# Patient Record
(demographics unavailable — no encounter records)

---

## 2019-08-23 NOTE — EDPHYS
Physician Documentation                                                                           

 [facility] – [place], [place]                                                                 

Name: Jessica Tejada                                                                               

Age: 8 yrs                                                                                        

Sex: Female                                                                                       

: 2011                                                                                   

MRN: [id number]                                                                                   

Arrival Date: 2019                                                                          

Time: 20:45                                                                                       

Account#: [id number]                                                                            

Bed 27                                                                                            

Private MD: Buzz Jay                                                                     

ED Physician Pal Haynes                                                                     

HPI:                                                                                              

                                                                                             

21:17 This 8 yrs old  Female presents to ER via Ambulatory with complaints of Finger  cp  

      Injury.                                                                                     

21:17 The patient or guardian reports injury, pain, swelling, tenderness. The complaints      cp  

      affect the right small finger.                                                              

21:17 Context: The problem was sustained at school, resulted from a direct blow, against      cp  

      chair. Onset: The symptoms/episode began/occurred today.                                    

                                                                                                  

Historical:                                                                                       

- Allergies:                                                                                      

21:01 No Known Allergies;                                                                     rv  

- Home Meds:                                                                                      

21:01 None [Active];                                                                          rv  

- PMHx:                                                                                           

21:01 None;                                                                                   rv  

- PSHx:                                                                                           

21:01 None;                                                                                   rv  

                                                                                                  

- Immunization history:: Childhood immunizations are up to date.                                  

- Ebola Screening: : No symptoms or risks identified at this time.                                

                                                                                                  

                                                                                                  

ROS:                                                                                              

21:30 Constitutional: Negative for body aches, chills, fever.                                 cp  

21:30 MS/extremity: Positive for injury or acute deformity, decreased range of motion, pain,  cp  

      swelling, tenderness, of the right small finger.                                            

21:30 All other systems are negative.                                                             

                                                                                                  

Exam:                                                                                             

21:35 Constitutional: The patient appears in no acute distress, alert, awake, well developed, cp  

      well nourished.                                                                             

21:35 Head/Face:  Normocephalic, atraumatic.                                                  cp  

21:35 Musculoskeletal/extremity: Extremities: grossly normal except: noted in the dorsal          

      aspect of proximal phalanx of right little finger and dorsal aspect of middle phalanx       

      of right little finger: pain, swelling, tenderness, ROM: limited passive range of           

      motion due to pain, in the right small finger, Perfusion: the extremity is normally         

      perfused throughout, Sensation intact.                                                      

21:35 Skin: cellulitis, is not appreciated, no rash present.                                      

                                                                                                  

Vital Signs:                                                                                      

21:00  / 62; Pulse 77; Resp 16; Temp 98.4; Pulse Ox 98% ; Weight 40.45 kg (M);          rv  

22:13 BP 90 / 73; Pulse 71; Resp 16; Pulse Ox 98% on R/A;                                     rv  

                                                                                                  

Procedures:                                                                                       

22:15 Splinting: Splint applied to right hand using Orthoglass splint, ulna gutter type.      cp  

      applied by nurse. Examined by me, post splint application: neurovascular intact,            

      Patient tolerated well.                                                                     

                                                                                                  

MDM:                                                                                              

20:55 Patient medically screened.                                                             cp  

22:07 Test interpretation: by ED physician or midlevel provider: xrays of right hand show     cp  

      fracture base of proximal phalanx fifth finger.                                             

22:10 Data reviewed: vital signs, nurses notes, radiologic studies, plain films.              cp  

22:10 Differential diagnosis: dislocation, open fracture, closed fracture, contusion.         cp  

      Response to treatment: the patient's symptoms have markedly improved after treatment.       

22:10 Counseling: I had a detailed discussion with the patient and/or guardian regarding: the cp  

      historical points, exam findings, and any diagnostic results supporting the                 

      discharge/admit diagnosis, radiology results, the need for outpatient follow up, a hand     

      specialist, to return to the emergency department if symptoms worsen or persist or if       

      there are any questions or concerns that arise at home.                                     

                                                                                                  

                                                                                             

21:22 Order name: XRAY Hand RIGHT 3 View                                                      cp  

                                                                                             

22:06 Order name: Splint - Ulnar Gutter; Complete Time: 22:11                                 cp  

                                                                                             

22:07 Order name: Misc. Order: carol tape fourth and fifth fingers; Complete Time: 22:11      cp  

                                                                                                  

Administered Medications:                                                                         

21:15 Drug: Tylenol 15 mg/kg Route: PO;                                                       rv  

22:11 Follow up: Response: No adverse reaction; Pain is decreased                             rv  

21:15 Drug: Motrin Suspension 10 mg/kg Route: PO;                                             rv  

22:12 Follow up: Response: No adverse reaction; Pain is decreased                             rv  

                                                                                                  

                                                                                                  

Disposition:                                                                                      

22:30 Chart complete.                                                                         cp  

                                                                                             

06:03 Co-signature as Attending Physician, Pal Haynes MD I agree with the assessment and tw4 

      plan of care.                                                                               

                                                                                                  

Disposition:                                                                                      

19 22:11 Discharged to Home. Impression: Nondisplaced fracture of proximal phalanx of       

  right little finger.                                                                            

- Condition is Stable.                                                                            

- Discharge Instructions: Ibuprofen Dosage Chart, Pediatric, Finger Fracture.                     

                                                                                                  

- Medication Reconciliation Form, Thank You Letter, Antibiotic Education, Prescription            

  Opioid Use form.                                                                                

- Follow up: Dennis Madera MD; When: 2 - 3 days; Reason: right fifth finger proximal           

  phalanx fracture.                                                                               

- Problem is new.                                                                                 

- Symptoms have improved.                                                                         

                                                                                                  

                                                                                                  

                                                                                                  

Signatures:                                                                                       

Dispatcher [employer]                           EDMS                                                 

Smith Laguna PA PA cp Wadley, Terrence, MD MD   tw4                                                  

Hebert Delgado RN                    RN   rv                                                   

                                                                                                  

Corrections: (The following items were deleted from the chart)                                    

                                                                                             

22:18 22:11 2019 22:11 Discharged to Home. Impression: Nondisplaced fracture of         rv  

      proximal phalanx of right little finger. Condition is Stable. Forms are Medication          

      Reconciliation Form, Thank You Letter, Antibiotic Education, Prescription Opioid Use.       

      Follow up: Dennis Madera; When: 2 - 3 days; Reason: right fifth finger proximal            

      phalanx fracture. Problem is new. Symptoms have improved. cp                                

                                                                                                  

**************************************************************************************************

## 2019-08-23 NOTE — ER
Nurse's Notes                                                                                     

 [facility]                                                                 

Name: Jessica Tejada                                                                               

Age: 8 yrs                                                                                        

Sex: Female                                                                                       

: 2011                                                                                   

MRN: [id number]                                                                                   

Arrival Date: 2019                                                                          

Time: 20:45                                                                                       

Account#: [id number]                                                                            

Bed 27                                                                                            

Private MD: Buzz Jay                                                                     

Diagnosis: Nondisplaced fracture of proximal phalanx of right little finger                       

                                                                                                  

Presentation:                                                                                     

                                                                                             

20:58 Presenting complaint: Mother states: SHE SMASHED HER FINGER IN SCHOOL EARLIER TODAY.    rv  

      THE NURSE PUT ICE PACK ON IT. SHE WAS FINE EARLIER BUT TONIGHT HER FINGER STARTED           

      SWELLING AND SHE IS HURTING A LOT. Transition of care: patient was not received from        

      another setting of care. Onset of symptoms was 2019 at 15:00. Care prior to      

      arrival: None.                                                                              

20:58 Method Of Arrival: Ambulatory                                                           rv  

20:58 Acuity: DENIA 3                                                                           rv  

                                                                                                  

Historical:                                                                                       

- Allergies:                                                                                      

21:01 No Known Allergies;                                                                     rv  

- Home Meds:                                                                                      

21:01 None [Active];                                                                          rv  

- PMHx:                                                                                           

21:01 None;                                                                                   rv  

- PSHx:                                                                                           

21:01 None;                                                                                   rv  

                                                                                                  

- Immunization history:: Childhood immunizations are up to date.                                  

- Ebola Screening: : No symptoms or risks identified at this time.                                

                                                                                                  

                                                                                                  

Screenin:03 Abuse screen: Denies threats or abuse. Denies injuries from another. Nutritional        rv  

      screening: No deficits noted. Tuberculosis screening: No symptoms or risk factors           

      identified.                                                                                 

21:03 Pedi Fall Risk Total Score: 0-1 Points : Low Risk for Falls.                            rv  

                                                                                                  

      Fall Risk Scale Score:                                                                      

21:03 Mobility: Ambulatory with no gait disturbance (0); Mentation: Developmentally           rv  

      appropriate and alert (0); Elimination: Independent (0); Hx of Falls: No (0); Current       

      Meds: No (0); Total Score: 0                                                                

Assessment:                                                                                       

21:01 General: Appears in no apparent distress. comfortable, Behavior is calm, cooperative.   rv  

      Pain: Complains of pain in dorsal aspect of middle phalanx of right little finger and       

      dorsal aspect of proximal phalanx of right little finger Pain does not radiate. Neuro:      

      Level of Consciousness is awake, alert, obeys commands, Oriented to person, place,          

      time, situation. Cardiovascular: Patient's skin is warm and dry. Respiratory: Airway is     

      patent. GI: No signs and/or symptoms were reported involving the gastrointestinal           

      system. : No signs and/or symptoms were reported regarding the genitourinary system.      

      EENT: No signs and/or symptoms were reported regarding the EENT system. Derm: Bruising      

      that is dark purple, on dorsal aspect of middle phalanx of right little finger and          

      dorsal aspect of proximal phalanx of right little finger. Musculoskeletal: Swelling         

      present in dorsal aspect of middle phalanx of right little finger and dorsal aspect of      

      proximal phalanx of right little finger Reports pain in dorsal aspect of middle phalanx     

      of right little finger and dorsal aspect of proximal phalanx of right little finger.        

      Injury Description:.                                                                        

22:12 Reassessment: Patient appears in no apparent distress at this time. Patient and/or      rv  

      family updated on plan of care and expected duration. Pain level reassessed. Patient is     

      alert/active/playful, equal unlabored respirations, skin warm/dry/pink.                     

                                                                                                  

Vital Signs:                                                                                      

21:00  / 62; Pulse 77; Resp 16; Temp 98.4; Pulse Ox 98% ; Weight 40.45 kg (M);          rv  

22:13 BP 90 / 73; Pulse 71; Resp 16; Pulse Ox 98% on R/A;                                     rv  

                                                                                                  

ED Course:                                                                                        

20:45 Patient arrived in ED.                                                                  es  

20:46 Buzz Jay MD is Private Physician.                                             es  

20:54 [facility]                                                                cp  

20:54 Pal Haynes MD is Attending Physician.                                            cp  

20:56 Hebert Delgado, FABIAN is Primary Nurse.                                                  rv  

21:00 Triage completed.                                                                       rv  

21:03 Arm band placed on left wrist.                                                          rv  

21:03 Patient has correct armband on for positive identification. Bed in low position. Call   rv  

      light in reach. Side rails up X 1. Adult w/ patient. Pulse ox on. NIBP on. Door closed.     

      Warm blanket given. Ice pack to injury.                                                     

21:34 XRAY Hand RIGHT 3 View In Process Unspecified.                                          EDMS

22:09 Dennis Madera MD is Referral Physician.                                              cp  

22:12 No provider procedures requiring assistance completed. Patient did not have IV access   rv  

      during this emergency room visit. Hardeep tape 4th and 5th digit of the right hand            

      Orthoglass splint: Ulnar gutter/Boxer splint applied on right forearm.                      

                                                                                                  

Administered Medications:                                                                         

21:15 Drug: Tylenol 15 mg/kg Route: PO;                                                       rv  

22:11 Follow up: Response: No adverse reaction; Pain is decreased                             rv  

21:15 Drug: Motrin Suspension 10 mg/kg Route: PO;                                             rv  

22:12 Follow up: Response: No adverse reaction; Pain is decreased                             rv  

                                                                                                  

                                                                                                  

Outcome:                                                                                          

22:11 Discharge ordered by MD.                                                                cp  

22:14 Discharged to home ambulatory, with family.                                             rv  

22:14 Condition: improved                                                                         

22:14 Discharge instructions given to patient, family, Instructed on discharge instructions,      

      follow up and referral plans. Demonstrated understanding of instructions, follow-up         

      care, splint care.                                                                          

22:18 Patient left the ED.                                                                    rv  

                                                                                                  

Signatures:                                                                                       

Dispatcher [employer]                           Gia Cisneros Corey, PA PA cp Vicente, Ronaldo RN                    RN   rv                                                   

                                                                                                  

Corrections: (The following items were deleted from the chart)                                    

22:15 22:14 Discharge instructions given to patient, family, Instructed on discharge          rv  

      instructions, follow up and referral plans. medication usage, Demonstrated                  

      understanding of instructions, follow-up care, medications, splint care, Prescriptions      

      given X 1, rv                                                                               

                                                                                                  

**************************************************************************************************

## 2019-08-24 NOTE — RAD REPORT
EXAM DESCRIPTION:  RAD - Hand Right 3 View - 8/23/2019 9:45 pm

 

CLINICAL HISTORY:  Blunt force trauma to a right hand finger. Sided injury not specified.

 

COMPARISON:  None.

 

FINDINGS:  Minimal fracture changes present at the base of the right fifth proximal phalanx. Fracture
 is on the ulna side of the metaphysis at the growth plate level. No distraction or angulation deform
ity. No other fracture of the fifth digit identified. Soft tissue swelling is present without foreign
 body.

 

 Remainder the hand shows no fracture or acute finding. No foreign body elsewhere in the hand.

 

IMPRESSION:  Minimal fracture change at the base of the fifth proximal phalanx with no distraction or
 angulation deformity.

## 2021-09-04 NOTE — XMS REPORT
Continuity of Care Document

                          Created on:2021



Patient:PHU STARKEY

Sex:Female

:2011

External Reference #:[id number]





Demographics







                          Address                   [address]



                                                    [place] [address]

 

                          Home Phone                [phone]

 

                          Email Address             NONE

 

                          Preferred Language        Unknown

 

                          Marital Status            Unknown

 

                          Mandaen Affiliation     Unknown

 

                          Race                      Unknown

 

                          Additional Race(s)        Unavailable

 

                          Ethnic Group              Unknown









Author







                          Organization              [facility]

t

 

                          Address                   [address] [place] [id number]

 

                          Phone                     [phone]









Care Team Providers







                    Name                Role                Phone

 

                    Unavailable         Unavailable         Unavailable









Problems

This patient has no known problems.



Allergies, Adverse Reactions, Alerts

This patient has no known allergies or adverse reactions.



Medications

This patient has no known medications.



Procedures

This patient has no known procedures.



Results

This patient has no known results.

## 2021-09-04 NOTE — EDPHYS
Physician Documentation                                                                           

 [facility]                                                                 

Name: Jessica Tejada                                                                               

Age: 10 yrs                                                                                       

Sex: Female                                                                                       

: 2011                                                                                   

MRN: [id number]                                                                                   

Arrival Date: 2021                                                                          

Time: 07:35                                                                                       

Account#: [id number]                                                                            

Bed DIS11                                                                                         

Private MD: Buzz Jay                                                                     

ED Physician Smith Katz                                                                      

HPI:                                                                                              

                                                                                             

09:45 This 10 yrs old  Female presents to ER via Ambulatory with complaints of Fever, pm1 

      Congestion, Cough.                                                                          

09:45 The parent or caregiver reports fever, that was measured at 103 degrees Fahrenheit.     pm1 

      Onset: The symptoms/episode began/occurred 3 day(s) ago. Modifying factors: there are       

      no obvious modifying factors. Associated signs and symptoms: Pertinent positives:           

      cough, body aches, sore throat, Pertinent negatives: chest pain, shortness of breath,       

      patient is able to tolerate oral fluids. Severity of symptoms: in the emergency             

      department the symptoms are unchanged. The patient has not experienced similar symptoms     

      in the past. The patient has not recently seen a physician.                                 

                                                                                                  

OB/GYN:                                                                                           

10:09 LMP N/A -                                                                               iw  

                                                                                                  

Historical:                                                                                       

- Allergies:                                                                                      

07:55 No Known Allergies;                                                                     aa5 

- PMHx:                                                                                           

07:55 None;                                                                                   aa5 

- PSHx:                                                                                           

07:55 None;                                                                                   aa5 

                                                                                                  

- Immunization history:: Childhood immunizations are up to date.                                  

                                                                                                  

                                                                                                  

ROS:                                                                                              

09:45 Eyes: Negative for injury, pain, redness, and discharge.                                pm1 

09:45 Cardiovascular: Negative for chest pain, palpitations, and edema.                           

09:45 Abdomen/GI: Negative for abdominal pain, nausea, vomiting, diarrhea, and constipation,      

      Back: Negative for injury and pain, MS/Extremity: Negative for injury and deformity,        

      Skin: Negative for injury, rash, and discoloration, Neuro: Negative for headache,           

      weakness, numbness, tingling, and seizure.                                                  

09:45 Constitutional: Positive for body aches, fever, Negative for poor PO intake.                

09:45 ENT: Positive for sore throat.                                                              

09:45 Respiratory: Positive for cough, Negative for shortness of breath, sputum production.       

09:45 All other systems are negative.                                                             

                                                                                                  

Exam:                                                                                             

09:45 Constitutional:  Well developed, well nourished child who is awake, alert and           pm1 

      cooperative with no acute distress. Head/Face:  Normocephalic, atraumatic.                  

09:45 Back:  No spinal tenderness.  No costovertebral tenderness.  Full range of motion.          

      Skin:  Warm and dry with excellent turgor.  capillary refill <2 seconds.  No cyanosis,      

      pallor, rash or edema. MS/ Extremity:  Pulses equal, no cyanosis.  Neurovascular            

      intact.  Full, normal range of motion.                                                      

09:45 Eyes: Exam is negative for acute changes, Extraocular movements: no acute changes,          

      Conjunctiva: no acute changes, no injection, Sclera: no acute changes, icterus, is not      

      appreciated.                                                                                

09:45 ENT: Exam is negative for acute changes, TM's: no acute changes, Posterior pharynx: no      

      acute changes.                                                                              

09:45 Cardiovascular: Exam negative for  acute changes, Rate: normal, Rhythm: regular,            

      Pulses: no pulse deficits are appreciated, Heart sounds: normal, normal S1and S2.           

09:45 Respiratory: Exam negative for  acute changes, respiratory distress, shortness of           

      breath, Breath sounds: are clear throughout.                                                

09:45 Neuro: Exam negative for acute changes, Orientation: is normal, Motor: is normal, moves     

      all fours.                                                                                  

                                                                                                  

Vital Signs:                                                                                      

07:56  / 66; Pulse 126; Resp 22 S; Temp 103.2(O); Pulse Ox 100% on R/A;                 aa5 

08:03 Weight 53.52 kg (M);                                                                    aa5 

09:34  / 64; Pulse 92; Resp 16 S; Temp 100.2(O); Pulse Ox 99% on R/A;                   aa5 

                                                                                                  

MDM:                                                                                              

09:39 Patient medically screened.                                                             pm1 

09:46 Data reviewed: vital signs. Data interpreted: Pulse oximetry: on room air is 99 %.      pm1 

      Interpretation: normal. Counseling: I had a detailed discussion with the patient and/or     

      guardian regarding: the historical points, exam findings, and any diagnostic results        

      supporting the discharge/admit diagnosis, lab results, the need for outpatient follow       

      up, to return to the emergency department if symptoms worsen or persist or if there are     

      any questions or concerns that arise at home.                                               

                                                                                                  

                                                                                             

07:58 Order name: COVID-19 : Document "Date of Symptom Onset" if Symptomatic.                 aa5 

                                                                                             

07:58 Order name: Strep                                                                       5 

                                                                                             

07:58 Order name: Flu                                                                         5 

                                                                                             

07:58 Order name: Group A Streptococcus Rapid Sc; Complete Time: 09:39                        EDMS

09/04                                                                                             

08:36 Order name: Throat Culture                                                              EDMS

                                                                                             

09:31 Order name: COVID-19/FLU A+B; Complete Time: 09:39                                      EDMS

                                                                                                  

Administered Medications:                                                                         

08:04 CANCELLED (Physician Discretion): Tylenol (acetaminophen) 15 mg/kg PO once; not to      aa5 

      exceed 1,000 milligrams                                                                     

08:07 Drug: Tylenol 650 mg Route: PO;                                                         aa5 

                                                                                                  

                                                                                                  

Disposition Summary:                                                                              

21 09:47                                                                                    

Discharge Ordered                                                                                 

      Location: Home                                                                          pm1 

      Problem: new                                                                            pm1 

      Symptoms: have improved                                                                 pm1 

      Condition: Stable                                                                       pm1 

      Diagnosis                                                                                   

        - Coronavirus infection, unspecified                                                  pm1 

      Followup:                                                                               pm1 

        - With: Emergency Department                                                               

        - When: As needed                                                                          

        - Reason: Worsening of condition                                                           

      Followup:                                                                               pm1 

        - With: Private Physician                                                                  

        - When: 2 - 3 days                                                                         

        - Reason: Recheck today's complaints, Continuance of care, Re-evaluation by your           

      physician                                                                                   

      Discharge Instructions:                                                                     

        - Discharge Summary Sheet                                                             pm1 

        - COVID-19                                                                            pm1 

        - COVID-19 Frequently Asked Questions                                                 pm1 

        - 10 Things You Can Do to Manage Your COVID-19 Symptoms at Home - [facility]                 pm1 

      Forms:                                                                                      

        - Medication Reconciliation Form                                                      pm1 

        - School release form                                                                 pm1 

        - Thank You Letter                                                                    pm1 

        - Antibiotic Education                                                                pm1 

        - Prescription Opioid Use                                                             pm1 

Addendum:                                                                                         

2021                                                                                        

     15:09 Co-signature as Attending Physician, Smith Katz MD I agree with the assessment and  c
ha

           plan of care.                                                                          

                                                                                                  

Signatures:                                                                                       

Dispatcher [employer]                           Smith England MD MD cha Calderon, Audri, RN                     RN   aa5                                                  

Antonio Kyle, DILLAN                    NP   pm1                                                  

                                                                                                  

Corrections: (The following items were deleted from the chart)                                    

                                                                                             

08:04 08:03 Tylenol (acetaminophen) 15 mg/kg PO once; not to exceed 1,000 milligrams ordered. aa5 

      aa5                                                                                         

08:14 07:58 CORONAVIRUS ordered. EDMS                                                         EDMS

08:16 07:58 Influenza Screen (A ordered. EDMS                                                 EDMS

                                                                                                  

**************************************************************************************************

## 2021-09-04 NOTE — ER
Nurse's Notes                                                                                     

 [facility]                                                                 

Name: Jessica Tejada                                                                               

Age: 10 yrs                                                                                       

Sex: Female                                                                                       

: 2011                                                                                   

MRN: [id number]                                                                                   

Arrival Date: 2021                                                                          

Time: 07:35                                                                                       

Account#: [id number]                                                                            

Bed DIS11                                                                                         

Private MD: Buzz Jay                                                                     

Diagnosis: Coronavirus infection, unspecified                                                     

                                                                                                  

Presentation:                                                                                     

                                                                                             

07:56 Chief complaint: Patient fever x 3 days ago. Pt also reports cough and sore throat.     aa5 

      Coronavirus screen: cough unrelated to allergies, fever. Ebola Screen: Patient denies       

      travel to an Ebola-affected area in the 21 days before illness onset. Onset of symptoms     

      was 2021.                                                                         

07:56 Method Of Arrival: Ambulatory                                                           aa5 

07:56 Acuity: DENIA 4                                                                           aa5 

                                                                                                  

OB/GYN:                                                                                           

10:09 LMP N/A -                                                                               iw  

                                                                                                  

Historical:                                                                                       

- Allergies:                                                                                      

07:55 No Known Allergies;                                                                     aa5 

- PMHx:                                                                                           

07:55 None;                                                                                   aa5 

- PSHx:                                                                                           

07:55 None;                                                                                   aa5 

                                                                                                  

- Immunization history:: Childhood immunizations are up to date.                                  

                                                                                                  

                                                                                                  

Screening:                                                                                        

10:09 Abuse screen: Denies threats or abuse. Denies injuries from another. Nutritional        iw  

      screening: No deficits noted. Tuberculosis screening: No symptoms or risk factors           

      identified.                                                                                 

10:09 Pedi Fall Risk Total Score: 0-1 Points : Low Risk for Falls.                            iw  

                                                                                                  

      Fall Risk Scale Score:                                                                      

10:09 Mobility: Ambulatory with no gait disturbance (0); Mentation: Developmentally           iw  

      appropriate and alert (0); Elimination: Independent (0); Hx of Falls: No (0); Current       

      Meds: No (0); Total Score: 0                                                                

Assessment:                                                                                       

10:08 General: Appears in no apparent distress. Behavior is calm, cooperative. Pain: Denies   iw  

      pain. Neuro: Level of Consciousness is awake, alert, obeys commands, Oriented to            

      person, place, time, situation. Cardiovascular: Patient's skin is warm and dry.             

      Respiratory: Airway is patent Breath sounds are clear bilaterally. GI: No signs and/or      

      symptoms were reported involving the gastrointestinal system. Derm: Skin is intact, is      

      healthy with good turgor. Musculoskeletal: Range of motion: intact in all extremities.      

      Age appropriate behavior- School age (6 to 12 yrs): understands body, Tries to problem      

      solve.                                                                                      

                                                                                                  

Vital Signs:                                                                                      

07:56  / 66; Pulse 126; Resp 22 S; Temp 103.2(O); Pulse Ox 100% on R/A;                 aa5 

08:03 Weight 53.52 kg (M);                                                                    aa5 

09:34  / 64; Pulse 92; Resp 16 S; Temp 100.2(O); Pulse Ox 99% on R/A;                   aa5 

                                                                                                  

ED Course:                                                                                        

07:35 Patient arrived in ED.                                                                  as  

07:36 Buzz Jay MD is Private Physician.                                             as  

07:56 Arm band placed on.                                                                     aa5 

07:57 Triage completed.                                                                       aa5 

08:07 COVID swab sent to lab. Flu and/or RSV swab sent to lab. Strep swab sent to lab.        aa5 

09:39 [facility]                                                           pm1 

09:39 Smith Katz MD is Attending Physician.                                             pm1 

10:00 Patient has correct armband on for positive identification.                             iw  

10:09 No provider procedures requiring assistance completed. Patient did not have IV access   iw  

      during this emergency room visit.                                                           

                                                                                                  

Administered Medications:                                                                         

08:04 CANCELLED (Physician Discretion): Tylenol (acetaminophen) 15 mg/kg PO once; not to      aa5 

      exceed 1,000 milligrams                                                                     

08:07 Drug: Tylenol 650 mg Route: PO;                                                         aa5 

                                                                                                  

                                                                                                  

Outcome:                                                                                          

09:47 Discharge ordered by MD.                                                                pm1 

10:09 Discharged to home ambulatory, with family.                                             iw  

10:09 Condition: good                                                                             

10:09 Discharge instructions given to patient, Instructed on discharge instructions, follow       

      up and referral plans. Demonstrated understanding of instructions, follow-up care.          

10:09 Patient left the ED.                                                                    iw  

                                                                                                  

Signatures:                                                                                       

Frieda Bruno Irene, RN                     RN   iw                                                   

Marisela Garnica RN                     RN   aa5                                                  

Antonio Kyle NP                    NP   pm1                                                  

                                                                                                  

**************************************************************************************************